# Patient Record
Sex: FEMALE | Race: ASIAN
[De-identification: names, ages, dates, MRNs, and addresses within clinical notes are randomized per-mention and may not be internally consistent; named-entity substitution may affect disease eponyms.]

---

## 2019-03-18 ENCOUNTER — HOSPITAL ENCOUNTER (INPATIENT)
Dept: HOSPITAL 80 - FED | Age: 84
LOS: 2 days | Discharge: HOME | DRG: 66 | End: 2019-03-20
Attending: HOSPITALIST | Admitting: HOSPITALIST
Payer: COMMERCIAL

## 2019-03-18 DIAGNOSIS — R29.705: ICD-10-CM

## 2019-03-18 DIAGNOSIS — I63.9: Primary | ICD-10-CM

## 2019-03-18 DIAGNOSIS — E11.9: ICD-10-CM

## 2019-03-18 DIAGNOSIS — K21.9: ICD-10-CM

## 2019-03-18 DIAGNOSIS — E86.9: ICD-10-CM

## 2019-03-18 DIAGNOSIS — R47.01: ICD-10-CM

## 2019-03-18 LAB
INR PPP: 0.89 (ref 0.83–1.16)
PLATELET # BLD: 296 10^3/UL (ref 150–400)
PROTHROMBIN TIME: 11.7 SEC (ref 12–15)

## 2019-03-18 PROCEDURE — C1764 EVENT RECORDER, CARDIAC: HCPCS

## 2019-03-18 RX ADMIN — SODIUM CHLORIDE AND POTASSIUM CHLORIDE SCH MLS: 9; 1.49 INJECTION, SOLUTION INTRAVENOUS at 12:48

## 2019-03-18 RX ADMIN — INSULIN LISPRO SCH: 100 INJECTION, SOLUTION INTRAVENOUS; SUBCUTANEOUS at 19:18

## 2019-03-18 NOTE — NEUROPROG
Assessment: 


[id number]_[id number]


  -


Neurology Consult:


-


CC: 


-


HPI: 


3/18/19: Pt last seen normal at 10 pm on 3/17/19.  Awoke at 6:30 am on 3/18/19 

with expressive aphasia so brought to [facility] ER.  Head CT showed no bleed and CTA 

head/neck showed no vessel occlusion or stenosis.  Pt outside window for TPA.  

Pt started on aspirin (not on prior to event).  Brain MRI showed left frontal 

cortical stroke.  Neurologic exam showed expressive aphasia and right tongue 

deviation.  Concern for embolic cause of stroke so will get cardiac eval.


-


PMHx: pt cannot answer due to aphasia


-


SHx: lives in [place]


FHx: pt cannot answer due to aphasia


-


ROS: Pt denied acute fever, total vision loss, active severe chest pain, 

respiratory failure, total body severe rash, total bowel/bladder incontinence, 

psychosis, active seizures, or active bleeding


-


O:


VS reviewed


General: Alert


Eyes: Fundoscopic exam not able to visualize optic disks


CV: Heart RRR, no murmur, no carotid bruit


Lungs: Clear to auscultation bilaterally, no rhonchi or rales


Neuro:


- Mental: pt has expressive aphasia and could not speak but was able to follow 1

-2 step commands, no further cognitive testing could be performed due to aphasia


- Cranial Nerves:


.     II: PERRL, VFFTC


.     III/IV/VI: EOMI, no nystagmus, normal smooth pursuits, no Ptosis


.     V: facial sensation intact to LT


.     VII: face symmetric to eye closure and smile


.     VIII: hearing intact to conversation


.     IX/X: uvula raises symmetrically


.     XI: SCM 5/5 B/L strength


.     XII: tongue protrudes to right with testing


- Motor: 


.     Tone: normal tone in all 4 extremity


.     Strength: no pronator drift, strength 5/5 throughout (B/L delt, bic, tri, 

hand , hf/he, df/pf)


- Reflexes: B/L bic/BR/patella 2/4


- Sensory: all 4 extremity intact to light touch


- Coord: CHERYL wnl


- Gait: deferred


-


Labs:


3/18/19- Na 137


-


Rads:


3/18/19- Head CT wo: no acute bleed


3/18/19- Head/neck CTA: no significant stenosis or vessel abnl


3/18/19- Brain MRI wo: left frontal cortical stroke (I personally visualized 

the images on 3/18/19)


-


Assessment:


1. Left frontal cortical stroke causing expressive aphasia on 3/18/19: CTA head/

neck, telemetry unremarkable for cause.  Given cortical location concern for 

emboli.  Pt not on aspirin prior to stroke.


-


Plan:


- TTE


- 24 hour telemetry


- If workup is negative then recommend 3 month LINQ monitor for any paroxysmal 

afib


- Begin aspirin 325 mg qd for stroke prevention


- Blood pressure goal < 220/120 x 48 hours then < 140/90


- LDL < 70 (lipid panel pending)


- H1AC < 7.0 (H1AC pending)


- PT/OT/Speech to determine rehab needs


- F/U in neurology clinic 1-4 weeks after hospital discharge





Objective: 





 Vital Signs











Temp Pulse Resp BP Pulse Ox


 


 36.7 C   80   16   136/97 H  94 


 


 03/18/19 10:43  03/18/19 12:00  03/18/19 12:00  03/18/19 12:00  03/18/19 12:00








 











PT  11.7 SEC (12.0-15.0)  L  03/18/19  07:06    


 


INR  0.89  (0.83-1.16)   03/18/19  07:06    











Allergies/Adverse Reactions: 


 





No Known Allergies Allergy (Unverified 03/18/19 07:39)

## 2019-03-18 NOTE — CPEKG
Test Reason : OPEN

Blood Pressure : ***/*** mmHG

Vent. Rate : 083 BPM     Atrial Rate : 083 BPM

   P-R Int : 166 ms          QRS Dur : 078 ms

    QT Int : 441 ms       P-R-T Axes : 063 006 034 degrees

   QTc Int : 519 ms

 

Sinus rhythm

Minimal ST depression, anterolateral leads

Prolonged QT interval

 

Confirmed by Ernesto Rodriguez ([id number]) on 3/18/2019 2:46:27 PM

 

Referred By: Ernesto Rodriguez           Confirmed By:Ernesto Rodriguez

## 2019-03-18 NOTE — ECHO
[email or web address]





[address] [address] 

Main: [phone] 



Echocardiography Examination 

Transthoracic 



Name:            JOSÉ ANTONIO SÁNCHEZ                         MR#:

[id number]

Study Date:      03/18/2019                             Study Time:

01:54 PM

YOB: 1935                             Age:

83 year(s)

Height:          147.3 cm (58 in.)                      Weight:

35.38 kg (78 lb.)

BSA:             1.22 m2                                Gender:

Female

Examination:     Echo with Agitated Saline              Contrast: 

Image Quality:   Good                                   Rhythm:

Normal sinus rhythm

Heart Rate:      77 bpm                                 BP:

133 mmHg/76 mmHg

Indication:      Ischemic Stroke 



Procedure Staff 

Referring Physician: 

Echocardiographer:         Tyler Balbuena RDCS 

Reading Physician:         Bassem Kelly MD 

Requesting Provider: 

Indication:                Ischemic Stroke 



Measurements 

Chambers                                           AV/MV 

Label                 Value       Normal Value          Label

Value       Normal Value

EF lower range (%)      75 %                            AV PGmax

8 mmHg

EF upper range (%)      80 %                            AV PGmean

4 mmHg

IVSd, 2D                0.8 cm    (0.6cm - 1.1cm)       AV Vmax

1.43 m/s

LVDd, 2D                4.1 cm    (3.9cm - 5.3cm)       DICKSON

(continuity eq.  1.7 cm2

LVDs, 2D                2.3 cm    (2.1cm - 4cm)         Vmax) 

LVEF, 2D                75 %      (54% - 74%)           DICKSON D

(continuity eq. 1.6 cm2

LVOT PGmax              3 mmHg                          VTI) 

LVOT PGmean             2 mmHg                          MV A Vmax

1.03 m/s

LVOT Vmax               0.93 m/s  (0.7m/s - 1.1m/s)     MV E' lateral

0.05 m/s

LVOT Vmean              0.58 m/s                        MV E' mean

0.04 m/s

LVOTd                   1.8 cm    (1.8cm - 2cm)         MV E' septal

0.03 m/s

LVPWd, 2D               0.9 cm                          MV E Vmax

0.52 m/s

TAPSE                   2 cm                            MV E/A

0.5

LA Area, A2C            15.8 cm2  (0cm2 - 20cm2)        MV E/E'

lateral      10.2

LA Volume, A2C          44 ml     (22ml - 52ml)         MV E/E' mean

13

LA Volume, A4C          31 ml     (22ml - 52ml)         MV E/E' septal

15.6 (0.45 - 1.25)

LA Volume, BP           38 ml     (22ml - 52ml)    TV/PV 

LAESV index, MOD4       25.4 ml/m2                      Label

Value       Normal Value

RA Area                 10.4 cm2                        RA Pressure

5 mmHg

Additional Vessels                                      RVSP

49 mmHg



Patient: JOSÉ ANTONIO SÁNCHEZ                      MRN: [id number]

Study Date: 03/18/2019   Page 1 of 3

01:54 PM 









Label Value Normal Value TR Pmax 44 mmHg 

AoRoot, MM              2.8 cm    (2.2cm - 3.7cm)       TR Vmax

3.3 m/s



PV PGmax             4 mmHg 

PV Vmax, Caliper     0.96 m/s     (0.6m/s - 0.9m/s) 



Conclusions 



Left Ventricle: 

 EF range is estimated at 75 % - 80 %.  



IAS: 

 An agitated saline study was performed and was positive for

intracardiac shunting.



Mitral Valve: 

 Trivial mitral regurgitation.  



Tricuspid Valve: 

 Mild to moderate tricuspid regurgitation.  



Findings 



Left Ventricle: 

Left ventricle is normal in size. The ejection fraction, measured by

2D, is 75 %. EF range is estimated at 75 % - 80 %.

Left ventricle wall thickness is normal. There are no regional wall

motion abnormalities. Cannot determine LAP and

Diastolic Dysfunction Grade.  

Right Ventricle: 

Normal size right ventricle. The RV function appears grossly normal.  

Left Atrium: 

The left atrium is normal in size.  

IAS: 

An agitated saline study was performed and was positive for

intracardiac shunting.

Right Atrium Measurements 

RA Area is 10.4 cm2.  

Mitral Valve: 

Mitral valve appears structurally normal. Trivial mitral

regurgitation. No mitral valve stenosis.

Aortic Valve: 

No aortic valve regurgitation. There is no aortic stenosis. The aortic

valve is trileaflet.

Tricuspid Valve: 

Mild to moderate tricuspid regurgitation. Right Ventricular systolic

pressure is measured at 49 mmHg. Pulmonary artery

pressure is mildly increased.  

Pulmonic Valve: 

Pulmonic leaflets are structurally normal. No pulmonic valve

regurgitation is evident.

Aorta: 

The aorta is normal. The aortic root size in M-mode measures 2.8 cm.  

Aorta Measurements 

AoRoot, MM is 2.8 cm.  

Pericardium: 

No pericardial effusion.  



Exam Details 

Procedure Ordered:         Echo with Agitated Saline 

Procedure Status:          Routine study 

Image Quality:             Good 



Patient: JOSÉ ANTONIO SÁNCHEZ                      MRN: [id number]

Study Date: 03/18/2019   Page 2 of 3

01:54 PM 









Facility Location: Cardiac Echo 1 



Electronically signed by Bassem Kelly MD on 03/18/2019 at 03:04

PM

(No Signature Object) 



Patient: JOSÉ ANTONIO SÁNCHEZ                      MRN: [id number]

Study Date: 03/18/2019   Page 3 of 3

01:54 PM 







[id number]

## 2019-03-18 NOTE — PDGENHP
History and Physical





- Chief Complaint


aphasia





- History of Present Illness


84 yo female with h/o DM and htn presented to ED after awakening this am unable 

to speak.  She was last seen normal last night around 10 pm.  This morning, she 

could not speak and there was a right sided facial droop noted.  EMS was called 

and she was brought to the ED where her symptoms persisted.  CTA was negative 

for large vessel occlusion.  MRI showed broca's area infarct.  She has no h/o 

prior CVA and no h/o A fib.  She is treated for hypertension and has well 

controlled diabetes.  She denies CP, SOB, headache, vision changes or focal 

limb weakness.  She has previously taken Aspirin 81 mg daily.


She is admitted for further management of acute CVA.





History Information





- Allergies/Home Medication List


Allergies/Adverse Reactions: 








No Known Allergies Allergy (Unverified 03/18/19 07:39)


 





Home Medications: 








Pantoprazole Sodium [Protonix 40mg (*)] 40 mg PO DAILY 03/18/19 [Last Taken 

Unknown]


glipiZIDE [Glucotrol] 10 mg PO BID 03/18/19 [Last Taken Unknown]





I have personally reviewed and updated: family history, medical history, social 

history, surgical history





- Past Medical History


diabetes type 2, hypertension





- Surgical History


Reports: no pertinent surgical hx





- Family History


Positive for: non-pertinent





- Social History


Smoking Status: Never smoked


Alcohol Use: None


Drug Use: None


Additional social history: Lives independently





Review of Systems


Review of Systems: 





ROS: 10pt was reviewed & negative except for what was stated in HPI & below





Physical Exam


Physical Exam: 

















Temp Pulse Resp BP Pulse Ox


 


 36.7 C   85   25 H  141/78 H  96 


 


 03/18/19 10:43  03/18/19 10:43  03/18/19 10:43  03/18/19 10:43  03/18/19 10:43











Constitutional: no apparent distress


Eyes: PERRL


Ears, Nose, Mouth, Throat: moist mucous membranes


Cardiovascular: regular rate and rhythym


Respiratory: no respiratory distress, clear to auscultation


Gastrointestinal: normoactive bowel sounds, soft, non-tender abdomen


Skin: warm


Musculoskeletal: full muscle strength


Neurologic: AAOx3, facial droop, other (aphasia)


Psychiatric: interacting appropriately





Lab Data & Imaging Review





 03/18/19 07:06





 03/18/19 07:06














WBC  6.50 10^3/uL (3.80-9.50)   03/18/19  07:06    


 


RBC  4.81 10^6/uL (4.18-5.33)   03/18/19  07:06    


 


Hgb  11.8 g/dL (12.6-16.3)  L  03/18/19  07:06    


 


POC Hgb  13.6 gm/dL (12.6-16.3)   03/18/19  07:14    


 


Hct  38.5 % (38.0-47.0)   03/18/19  07:06    


 


POC Hct  40 % (38-47)   03/18/19  07:14    


 


MCV  80.0 fL (81.5-99.8)  L  03/18/19  07:06    


 


MCH  24.5 pg (27.9-34.1)  L  03/18/19  07:06    


 


MCHC  30.6 g/dL (32.4-36.7)  L  03/18/19  07:06    


 


RDW  17.5 % (11.5-15.2)  H  03/18/19  07:06    


 


Plt Count  296 10^3/uL (150-400)   03/18/19  07:06    


 


MPV  8.8 fL (8.7-11.7)   03/18/19  07:06    


 


Neut % (Auto)  36.4 % (39.3-74.2)  L  03/18/19  07:06    


 


Lymph % (Auto)  50.9 % (15.0-45.0)  H  03/18/19  07:06    


 


Mono % (Auto)  10.6 % (4.5-13.0)   03/18/19  07:06    


 


Eos % (Auto)  1.4 % (0.6-7.6)   03/18/19  07:06    


 


Baso % (Auto)  0.5 % (0.3-1.7)   03/18/19  07:06    


 


Nucleat RBC Rel Count  0.0 % (0.0-0.2)   03/18/19  07:06    


 


Absolute Neuts (auto)  2.37 10^3/uL (1.70-6.50)   03/18/19  07:06    


 


Absolute Lymphs (auto)  3.31 10^3/uL (1.00-3.00)  H  03/18/19  07:06    


 


Absolute Monos (auto)  0.69 10^3/uL (0.30-0.80)   03/18/19  07:06    


 


Absolute Eos (auto)  0.09 10^3/uL (0.03-0.40)   03/18/19  07:06    


 


Absolute Basos (auto)  0.03 10^3/uL (0.02-0.10)   03/18/19  07:06    


 


Absolute Nucleated RBC  0.00 10^3/uL (0-0.01)   03/18/19  07:06    


 


Immature Gran %  0.2 % (0.0-1.1)   03/18/19  07:06    


 


Immature Gran #  0.01 10^3/uL (0.00-0.10)   03/18/19  07:06    


 


PT  11.7 SEC (12.0-15.0)  L  03/18/19  07:06    


 


INR  0.89  (0.83-1.16)   03/18/19  07:06    


 


APTT  27.1 SEC (23.0-38.0)   03/18/19  07:06    


 


POC Sodium  137 mEq/L (135-145)   03/18/19  07:14    


 


Sodium  135 mEq/L (135-145)   03/18/19  07:06    


 


POC Potassium  3.0 mEq/L (3.3-5.0)  L  03/18/19  07:14    


 


Potassium  3.1 mEq/L (3.5-5.2)  L  03/18/19  07:06    


 


POC Chloride  99 mEq/L ()   03/18/19  07:14    


 


Chloride  99 mEq/L ()   03/18/19  07:06    


 


Carbon Dioxide  23 mEq/l (22-31)   03/18/19  07:06    


 


POC Total CO2  23 mEq/L (22-31)   03/18/19  07:14    


 


Anion Gap  13 mEq/L (6-14)   03/18/19  07:06    


 


POC BUN  12 mg/dL (7-23)   03/18/19  07:14    


 


BUN  14 mg/dL (7-23)   03/18/19  07:06    


 


Creatinine  0.6 mg/dL (0.6-1.0)   03/18/19  07:06    


 


POC Creatinine  0.5 mg/dL (0.6-1.0)  L  03/18/19  07:14    


 


Estimated GFR  > 60   03/18/19  07:06    


 


Glucose  204 mg/dL ()  H  03/18/19  07:06    


 


POC Glucose  214 mg/dL ()  H  03/18/19  07:14    


 


Calcium  8.8 mg/dL (8.5-10.4)   03/18/19  07:06    


 


POC Troponin I  0.00 ng/mL (0.00-0.08)   03/18/19  07:17    


 


TSH  1.370 uIU/mL (0.465-4.680)   03/18/19  07:06    








Visualized and Interpreted EKG results: Yes


EKG Interpretation: Positive for: normal sinsus rhythm, ST depression





Assessment & Plan


Assessment: 








Acute ischemic stroke with Broca's aphasia - and right sided facial droop.  MRI 

confirms Broca's area infarct.  CTA head/neck neg for lg vessel occlusion.  

Currently normotensive.  No h/o a fib.


   -admit for telemetry monitoring, neurochecks


   -increase ASA to 325 daily, add statin, check lipid panel in am


   -check echo


   -TSH, a1c


   -glycemic control


   -permissive htn, prn labetalol for SBP >220, DBP >120


   -NPO for now pending speech eval, along with PT/OT


   -neurology to consult


   -if no a fib detected here, will discuss linq placement with cards prior to 

dc





DM type 2 - hold glipizide, SSI for now





Full code





Dispo - admit to inpt, anticipate >48 hrs hospitalization for ongoing 

management of acute stroke and aphasia

## 2019-03-18 NOTE — EDPHY
H & P


Time Seen by Provider: 03/18/19 07:05


Constitutional: 


 Initial Vital Signs











Temperature (C)  36.6 C   03/18/19 07:30


 


Heart Rate  84   03/18/19 07:30


 


Respiratory Rate  18   03/18/19 07:30


 


Blood Pressure  141/58 H  03/18/19 07:30


 


O2 Sat (%)  99   03/18/19 07:30








 











O2 Delivery Mode               Room Air














Allergies/Adverse Reactions: 


 





No Known Allergies Allergy (Unverified 03/18/19 07:39)


 








Home Medications: 














 Medication  Instructions  Recorded


 


Glipizide  03/18/19














Medical Decision Making





- Diagnostics


Imaging Results: 


 Imaging Impressions





Head CT  03/18/19 07:12


Impression: 


1. Possible acute/subacute ischemia left temporal lobe as described, MRI is 

recommended for further evaluation.


 


Final results are concordant with initial interpretation.  Preliminary report 

was communicated to the referring provider at 7:43 AM.  ALYSSA.


 


 








Head CTA  03/18/19 07:12


Impression:


 


1. There is no hemodynamically significant ICA stenosis.


2. Patent vertebral arteries.


 


 


CT ANGIOGRAPHY OF THE BRAIN: Study is limited by venous contamination. The 

major vessels of the [place] are well visualized, and there is no 

aneurysm, vascular malformation, flow-limiting stenosis, or acute occlusion 

identified. Calcified atherosclerotic plaque is seen in the internal carotid 

arteries without high-grade stenosis or occlusion. The distal cervical, petrous

, cavernous, and supraclinoid portions of the internal carotid arteries are 

patent. Anterior and middle cerebral arteries are widely patent. Hypoplastic 

right A1 segment. With regards to the posterior circulation, the distal 

vertebral arteries are patent. The there is fetal origin of the right PCA. The 

vertebrobasilar confluence and Posterior cerebral arteries are patent. 


 


Impression:  Negative CT angiogram of the brain. With high clinical suspicion 

for acute ischemia, MRI is recommended for further evaluation. Please see 

separately dictated CT head for further findings.


 


Final results are concordant with initial interpretation.  Preliminary report 

was communicated to the referring provider at 7:51 AM.  1.


  


 


 








Neck CTA  03/18/19 07:12


Impression:


 


1. There is no hemodynamically significant ICA stenosis.


2. Patent vertebral arteries.


 


 


CT ANGIOGRAPHY OF THE BRAIN: Study is limited by venous contamination. The 

major vessels of the [place] are well visualized, and there is no 

aneurysm, vascular malformation, flow-limiting stenosis, or acute occlusion 

identified. Calcified atherosclerotic plaque is seen in the internal carotid 

arteries without high-grade stenosis or occlusion. The distal cervical, petrous

, cavernous, and supraclinoid portions of the internal carotid arteries are 

patent. Anterior and middle cerebral arteries are widely patent. Hypoplastic 

right A1 segment. With regards to the posterior circulation, the distal 

vertebral arteries are patent. The there is fetal origin of the right PCA. The 

vertebrobasilar confluence and Posterior cerebral arteries are patent. 


 


Impression:  Negative CT angiogram of the brain. With high clinical suspicion 

for acute ischemia, MRI is recommended for further evaluation. Please see 

separately dictated CT head for further findings.


 


Final results are concordant with initial interpretation.  Preliminary report 

was communicated to the referring provider at 7:51 AM.  1.


  


 


 











Imaging: Discussed imaging studies w/ On call Radiologist, I viewed and 

interpreted images myself


ED Course/Re-evaluation: 





CHIEF COMPLAINT:  Stroke alert





HISTORY OF PRESENT ILLNESS:  


The patient is an 84 y/o female arriving via EMS as a stroke alert. Per EMS, 

the patient was last seen normal at 22:00, 9 hours ago. When the patient woke 

up this morning at 06:30, her  noted that the patient was unable to speak

, which is not normal. The patient was able to walk out to the ambulance. While 

en route to the emergency department she was hypertensive with a BP of 200/100 

as well as a HR of 100 and BGL of 233. 





REVIEW OF SYSTEMS:  





Unable to obtain secondary to patient's mental status





PHYSICAL EXAM:  





HR, BP, O2 Sat, RR.  Temp noted


General Appearance:  Alert, well hydrated, appropriate, and non-toxic appearing.


Head:  Atraumatic without scalp tenderness or obvious injury


Eyes:  Pupils equal, round, reactive to light and accommodation, EOMI, no trauma

, no injection.


Ears:  Clear bilaterally, no perforation, normal landmarks


Nose:  Atraumatic, no rhinorrhea, clear.


Throat:  There is no erythema or exudates, no lesions, normal tonsils, mucus 

membranes moist.


Neck:  Supple, 2+ carotid upstroke, nontender, no lymphadenopathy.


Respiratory:  No retractions, no distress, no wheezes, and no accessory muscle 

use.  Lungs are clear to auscultation bilaterally.


Cardiovascular:  Regular rate and rhythm, no murmurs, rubs, or gallops. 

Bilateral carotid, radial, dorsalis pedis, and posterior tibial pulses intact. 

Good capillary refill all extremities.


Gastrointestinal:  Abdomen is soft, nontender, non-distended, no masses, no 

rebound, no guarding, no peritoneal signs.


Musculoskeletal:  Normal active ROM of all extremities, atraumatic.


Neurological:  Patient has Broca's aphasia and is not talking. The patient has 

non-focal cranial nerves, motor, sensory, and cerebellar exam.


Skin:  No rashes, good turgor, no nodules on palpation.





Past medical history: Unknown


Past surgical history: Unknown


Family history: Unknown


Social history: , lives in [place], retired





DIAGNOSTICS/PROCEDURES/CRITICAL CARE TIME:  





EKG: The 12 lead EKG was interpreted by myself as sinus rhythm with a rate of 83

, minimal ST depression, prolonged QT interval. See hard copy and/or 

"tracemaster" electronic copy for interpretation.





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





Head CTA: No acute findings.





Neck CTA: No acute findings.





Brain MRI: Infarct in Broca's area.





DIFFERENTIAL DIAGNOSIS:   


The differential diagnosis for the patient's altered mental status included but 

was not limited to Broca's area infarct, Broca's aphasia, hypoglycemia, 

infectious process, electrolyte abnormality, head injury, neurologic process, 

anemia, cardiac process, and intoxicants.





MEDICAL DECISION MAKING:  


The patient is an 84 y/o female arriving via EMS as a stroke alert as she was 

unable to talk this morning. She was last seen normal at 22:00, 9 hours ago. 

She is able to walk without difficult. On exam the patient has no peripheral or 

cerebellar findings. She does have Broca's aphasia and is not talking at all. 

She is not a TPA candidate as she is out of the TPA time frame. I suspect she 

is having a large vessel occlusion. Labs, EKG, head CT, head and neck CTA 

ordered. Patient will be sent directly to CT.





0705: I met EMS upon arrival.





0730: I viewed patient's imaging findings, which have no acute findings. 

Radiologist reading still pending.





0731: Patient is back from CT; [facility] has been paged.





0732: I interpreted patient's EKG as sinus rhythm with a rate of 83, minimal ST 

depression, prolonged QT interval.





0734: I consulted with Dr. Bansal, neurologist, from [facility] 

regarding this patient. She agrees that unless there are findings on the CTA's 

there are no additional interventions to perform.





0745: I received the fax from the over-night radiology for the head CT, which 

reveals a subtle hyperdensity in left temporal lobe which is congenital.





0747: I consulted with Dr. Bansal regarding patient's exam and head CT findings. 

Upon re-examination she is making more noises than before and is able to write 

words. If the patient's vessels are occluded, we will send her to [facility]. If 

there is no occlusion, the patient will be admitted here. Her BP is 141/58; 

324mg PO Aspirin and 500cc Bolus of IV NS. Brain MRI ordered.





0743: I spoke with Dr. Dias, radiologist from [facility], regarding patient's 

CT and CTA findings.  There are no acute findings besides the congenital 

anomaly.





0815: Patient had difficulty taking the Aspirin; she will need a swallow study.





0816: I consulted with the hospitalist service, Dr. Cushing accepts admission 

of this patient. The swallow study will be performed upon admission.





0827: I consulted with Dr. Acosta, neurologist, regarding patient and plan for 

admission. He agrees to follow this patient during her admission.





0949: I spoke with Dr. Marie, radiologist, who reports that the patient has an 

infarct in Broca's area. Patient is safe to be transported to the floor.








- Data Points


Laboratory Results: 


 Laboratory Results





 03/18/19 07:06 





 03/18/19 07:06 





 











  03/18/19 03/18/19 03/18/19





  07:17 07:14 07:06


 


WBC      





    


 


RBC      





    


 


Hgb      





    


 


POC Hgb    13.6 gm/dL gm/dL  





    (12.6-16.3)  


 


Hct      





    


 


POC Hct    40 % %  





    (38-47)  


 


MCV      





    


 


MCH      





    


 


MCHC      





    


 


RDW      





    


 


Plt Count      





    


 


MPV      





    


 


Neut % (Auto)      





    


 


Lymph % (Auto)      





    


 


Mono % (Auto)      





    


 


Eos % (Auto)      





    


 


Baso % (Auto)      





    


 


Nucleat RBC Rel Count      





    


 


Absolute Neuts (auto)      





    


 


Absolute Lymphs (auto)      





    


 


Absolute Monos (auto)      





    


 


Absolute Eos (auto)      





    


 


Absolute Basos (auto)      





    


 


Absolute Nucleated RBC      





    


 


Immature Gran %      





    


 


Immature Gran #      





    


 


PT      





    


 


INR      





    


 


APTT      





    


 


POC Sodium    137 mEq/L mEq/L  





    (135-145)  


 


Sodium      135 mEq/L mEq/L





     (135-145) 


 


POC Potassium    3.0 mEq/L L mEq/L  





    (3.3-5.0)  


 


Potassium      3.1 mEq/L L mEq/L





     (3.5-5.2) 


 


POC Chloride    99 mEq/L mEq/L  





    ()  


 


Chloride      99 mEq/L mEq/L





     () 


 


Carbon Dioxide      23 mEq/l mEq/l





     (22-31) 


 


POC Total CO2    23 mEq/L mEq/L  





    (22-31)  


 


Anion Gap      13 mEq/L mEq/L





     (6-14) 


 


POC BUN    12 mg/dL mg/dL  





    (7-23)  


 


BUN      14 mg/dL mg/dL





     (7-23) 


 


Creatinine      0.6 mg/dL mg/dL





     (0.6-1.0) 


 


POC Creatinine    0.5 mg/dL L mg/dL  





    (0.6-1.0)  


 


Estimated GFR      > 60 





    


 


Glucose      204 mg/dL H mg/dL





     () 


 


POC Glucose    214 mg/dL H mg/dL  





    ()  


 


Calcium      8.8 mg/dL mg/dL





     (8.5-10.4) 


 


POC Troponin I  0.00 ng/mL ng/mL    





   (0.00-0.08)   














  03/18/19 03/18/19





  07:06 07:06


 


WBC    6.50 10^3/uL 10^3/uL





    (3.80-9.50) 


 


RBC    4.81 10^6/uL 10^6/uL





    (4.18-5.33) 


 


Hgb    11.8 g/dL L g/dL





    (12.6-16.3) 


 


POC Hgb    





   


 


Hct    38.5 % %





    (38.0-47.0) 


 


POC Hct    





   


 


MCV    80.0 fL L fL





    (81.5-99.8) 


 


MCH    24.5 pg L pg





    (27.9-34.1) 


 


MCHC    30.6 g/dL L g/dL





    (32.4-36.7) 


 


RDW    17.5 % H %





    (11.5-15.2) 


 


Plt Count    296 10^3/uL 10^3/uL





    (150-400) 


 


MPV    8.8 fL fL





    (8.7-11.7) 


 


Neut % (Auto)    36.4 % L %





    (39.3-74.2) 


 


Lymph % (Auto)    50.9 % H %





    (15.0-45.0) 


 


Mono % (Auto)    10.6 % %





    (4.5-13.0) 


 


Eos % (Auto)    1.4 % %





    (0.6-7.6) 


 


Baso % (Auto)    0.5 % %





    (0.3-1.7) 


 


Nucleat RBC Rel Count    0.0 % %





    (0.0-0.2) 


 


Absolute Neuts (auto)    2.37 10^3/uL 10^3/uL





    (1.70-6.50) 


 


Absolute Lymphs (auto)    3.31 10^3/uL H 10^3/uL





    (1.00-3.00) 


 


Absolute Monos (auto)    0.69 10^3/uL 10^3/uL





    (0.30-0.80) 


 


Absolute Eos (auto)    0.09 10^3/uL 10^3/uL





    (0.03-0.40) 


 


Absolute Basos (auto)    0.03 10^3/uL 10^3/uL





    (0.02-0.10) 


 


Absolute Nucleated RBC    0.00 10^3/uL 10^3/uL





    (0-0.01) 


 


Immature Gran %    0.2 % %





    (0.0-1.1) 


 


Immature Gran #    0.01 10^3/uL 10^3/uL





    (0.00-0.10) 


 


PT  11.7 SEC L SEC  





   (12.0-15.0)  


 


INR  0.89   





   (0.83-1.16)  


 


APTT  27.1 SEC SEC  





   (23.0-38.0)  


 


POC Sodium    





   


 


Sodium    





   


 


POC Potassium    





   


 


Potassium    





   


 


POC Chloride    





   


 


Chloride    





   


 


Carbon Dioxide    





   


 


POC Total CO2    





   


 


Anion Gap    





   


 


POC BUN    





   


 


BUN    





   


 


Creatinine    





   


 


POC Creatinine    





   


 


Estimated GFR    





   


 


Glucose    





   


 


POC Glucose    





   


 


Calcium    





   


 


POC Troponin I    





   











Medications Given: 


 








Discontinued Medications





Aspirin (Aspirin)  324 mg PO EDNOW ONE


   Stop: 03/18/19 07:51


   Last Admin: 03/18/19 07:53 Dose:  324 mg


Sodium Chloride (Ns)  1,000 mls @ 0 mls/hr IV EDNOW ONE; [place]


   PRN Reason: Protocol


   Stop: 03/18/19 07:50


   Last Admin: 03/18/19 07:57 Dose:  1,000 mls





Point of Care Test Results: 


 Chemistry











  03/18/19 03/18/19





  07:17 07:14


 


POC Sodium    137 mEq/L mEq/L





    (135-145) 


 


POC Potassium    3.0 mEq/L L mEq/L





    (3.3-5.0) 


 


POC Chloride    99 mEq/L mEq/L





    () 


 


POC Total CO2    23 mEq/L mEq/L





    (22-31) 


 


POC BUN    12 mg/dL mg/dL





    (7-23) 


 


POC Creatinine    0.5 mg/dL L mg/dL





    (0.6-1.0) 


 


POC Glucose    214 mg/dL H mg/dL





    () 


 


POC Troponin I  0.00 ng/mL ng/mL  





   (0.00-0.08)  








 ISTAT H&H











  03/18/19





  07:14


 


POC Hgb  13.6 gm/dL gm/dL





   (12.6-16.3) 


 


POC Hct  40 % %





   (38-47) 














Departure





- Departure


Disposition: [facility] Inpatient Acute


Clinical Impression: 


 Acute ischemic stroke, Broca's aphasia, Broca's area infarct





Condition: Fair


Referrals: 


Patient,NotPresent [Unknown] - As per Instructions


Report Scribed for: Ernesto Rodriguez


Report Scribed by: Indu Kelly


Date of Report: 03/18/19


Time of Report: 06:58

## 2019-03-18 NOTE — PDMN
Medical Necessity


Medical necessity: Pt meets inpt criteria per MD order and MCG M-83, Stroke: 

Ischemic, 2 days. 84 y/o w/acute ischemic stroke w/Broca's aphasia and R sided 

facial droop. Broca's area infarct confirmed w/MRI. Neuro consult, PT/OT/SP 

evals pending, tele, ECHO pending, anticipate>2MN for ongoing management of 

above.

## 2019-03-19 PROCEDURE — 0JH60PZ INSERTION OF CARDIAC RHYTHM RELATED DEVICE INTO CHEST SUBCUTANEOUS TISSUE AND FASCIA, OPEN APPROACH: ICD-10-PCS | Performed by: INTERNAL MEDICINE

## 2019-03-19 RX ADMIN — ATORVASTATIN CALCIUM SCH MG: 20 TABLET, FILM COATED ORAL at 08:15

## 2019-03-19 RX ADMIN — INSULIN LISPRO SCH UNITS: 100 INJECTION, SOLUTION INTRAVENOUS; SUBCUTANEOUS at 17:39

## 2019-03-19 RX ADMIN — ENOXAPARIN SODIUM SCH MG: 100 INJECTION SUBCUTANEOUS at 08:15

## 2019-03-19 RX ADMIN — PANTOPRAZOLE SODIUM SCH: 40 TABLET, DELAYED RELEASE ORAL at 10:34

## 2019-03-19 RX ADMIN — INSULIN LISPRO SCH: 100 INJECTION, SOLUTION INTRAVENOUS; SUBCUTANEOUS at 07:34

## 2019-03-19 RX ADMIN — INSULIN LISPRO SCH UNITS: 100 INJECTION, SOLUTION INTRAVENOUS; SUBCUTANEOUS at 12:23

## 2019-03-19 RX ADMIN — ENOXAPARIN SODIUM SCH: 100 INJECTION SUBCUTANEOUS at 09:18

## 2019-03-19 RX ADMIN — SODIUM CHLORIDE AND POTASSIUM CHLORIDE SCH MLS: 9; 1.49 INJECTION, SOLUTION INTRAVENOUS at 02:17

## 2019-03-19 NOTE — NEUROPROG
Assessment: 


[id number]


  -


Neurology Consult:


-


CC: F/U for stroke


-


Narrative Summary:


3/18/19: Pt last seen normal at 10 pm on 3/17/19.  Awoke at 6:30 am on 3/18/19 

with expressive aphasia so brought to [facility] ER.  Head CT showed no bleed and CTA 

head/neck showed no vessel occlusion or stenosis.  Pt outside window for TPA.  

Pt on aspirin prior to stroke.  Brain MRI showed left frontal cortical stroke.  

Neurologic exam showed expressive aphasia and right tongue deviation.


-


HPI:


F/U 3/19/19.  LDL 45L but H1AC elevated at 9.1.  TTE and telemetry show no 

clear cause of stroke.  Given elevated H1AC I suspect this is the likely cause 

of stroke.  Pt was on aspirin prior to stroke so I would recommend changing 

aspirin to plavix (aspirin failure).  Recommend pt get LINQ monitor by 

cardiology to assess for paroxysmal afib.


-


PMHx: pt cannot answer due to aphasia


-


SHx: lives in [place]


FHx: pt cannot answer due to aphasia


-


ROS: Pt denied acute fever, total vision loss, active severe chest pain, 

respiratory failure, total body severe rash, total bowel/bladder incontinence, 

psychosis, active seizures, or active bleeding


-


Labs:


3/18/19- LDL 45L, H1AC 9.1H


-


Rads:


3/18/19- Head CT wo: no acute bleed


3/18/19- Head/neck CTA: no significant stenosis or vessel abnl


3/18/19- Brain MRI wo: left frontal cortical stroke


3/18/19- TTE: no cardiac thrombus noted, PFO noted but unlikely cause of stroke


3/18/19- Telemetry: no afib noted


-


Assessment:


1. Left frontal cortical stroke causing expressive aphasia on 3/18/19: LDL 45L 

but H1AC 9.1 so likely cause of stroke.  CTA head/neck, telemetry, TTE 

unremarkable for cause.  Given cortical location concern for emboli.  Pt on 

aspirin prior to stroke so will change to plavix.  I will also recommend LINQ 

monitor.


-


Plan:


- recommend LINQ monitor for any paroxysmal afib


- Change aspirin to plavix 75 mg qd (stroke on 3/18/19 occurred on aspirin) for 

stroke prevention


- Blood pressure goal < 220/120 x 24 hours then < 140/90


- LDL < 70 (45)


- H1AC < 7.0 (9.1)


- PT/OT/Speech to determine rehab needs


- F/U in neurology clinic 1-4 weeks after hospital discharge


- No further inpt w/u needed, neurology will sign off





Objective: 





 Vital Signs











Temp Pulse Resp BP Pulse Ox


 


 36.4 C   74   20   127/73 H  93 


 


 03/19/19 07:19  03/19/19 07:19  03/19/19 07:19  03/19/19 07:19  03/19/19 07:19








 Laboratory Results





 03/19/19 05:15 





 











 03/18/19 03/19/19 03/20/19





 05:59 05:59 05:59


 


Intake Total  2309 


 


Balance  2309 








 











PT  11.7 SEC (12.0-15.0)  L  03/18/19  07:06    


 


INR  0.89  (0.83-1.16)   03/18/19  07:06    











Allergies/Adverse Reactions: 


 





No Known Allergies Allergy (Unverified 03/18/19 07:39)

## 2019-03-19 NOTE — ASMTCMCOM
CM Note

 

CM Note                       

Notes:

CM met with pt, her  and daughter. Family is advocating for pt to go to inpt rehab. CM 

initiated referral to [place] for approval for rehab. CM provided education on SNF if [place] does 

not approve inpt rehab. CM to follow. 



Plan: Inpatient rehab pending [place] approval. 

 

Date Signed:  03/19/2019 11:49 AM

Electronically Signed By:NYDIA Blackwood

## 2019-03-19 NOTE — PDCTREPORT
Cardiothoracic Procedure Rpt


Cardiothoracic Procedure Report: 





Loop recorder insertion


Indications cryptogenic stroke


Patient brought to the CV in the fasting state.  Left gustavo pectoral region was 

sterilely prepped and draped.  Small site was locally anesthetized with 2% 

xylocaine.  Stab wound was made.  Using the insertion tool a [employer] loop 

recorder was inserted under the skin.  2 staples were used to close the 

incision.  Conclusions successful implantation of a LINQ loop recorder.





Patient Problems: 


 Problems











Problem Status Onset


 


Acute ischemic stroke Acute  


 


Broca's aphasia Acute

## 2019-03-19 NOTE — HOSPPROG
Hospitalist Progress Note


Assessment/Plan: 





Acute ischemic stroke with Broca's aphasia - right sided facial droop and 

aphasia persist.  MRI confirms Broca's area infarct.  CTA head/neck neg for lg 

vessel occlusion.  Currently normotensive.  No e/o a fib on telemetry (pers 

reviewed/interp).  Echo with +intracardiac shunt.  


   -cont telemetry monitoring


   -change ASA to Plavix (had event on ASA 81 mg)


   -cont statin (LDL 45)


   -glycemic control


   -permissive htn, prn labetalol for SBP >220, DBP >120 though pt remains 

normotensive


   -cont speech, PT/OT


   -neurology consult appreciated, discussed with Dr. Acosta


   -no indication for MADELYN to further evaluate for PFO given her age, not 

candidate for closure


   -if no a fib detected here, will plan for linq placement with cards prior to 

dc, possibly tomorrow





DM type 2 - a1c 9 on max dose glipizide, unclear why she is not on MTF


   -hold glipizide, cont SSI for now


   -will discuss addition of MTF at discharge (defer for now given recent 

conrast) +/- addition of insulin





GERD - PPI





Full code





Dispo - cont inpt, will likely dc to inpt rehab, consult placed





Subjective: PT feels well.  Ambulated in halls without assistance.  Still some 

aphasia, but was able to say water this am.  Still some facial droop on the 

right.  Eating/drinking ok.


Objective: 


 Vital Signs











Temp Pulse Resp BP Pulse Ox


 


 36.4 C   74   20   127/73 H  93 


 


 03/19/19 07:19  03/19/19 07:19  03/19/19 07:19  03/19/19 07:19  03/19/19 07:19








 Laboratory Results





 03/19/19 05:15 





 











 03/18/19 03/19/19 03/20/19





 05:59 05:59 05:59


 


Intake Total  2309 


 


Balance  2309 








 











PT  11.7 SEC (12.0-15.0)  L  03/18/19  07:06    


 


INR  0.89  (0.83-1.16)   03/18/19  07:06    














- Physical Exam


Constitutional: no apparent distress


Eyes: PERRL


Ears, Nose, Mouth, Throat: moist mucous membranes


Cardiovascular: regular rate and rhythym


Respiratory: no respiratory distress, clear to auscultation


Gastrointestinal: normoactive bowel sounds, soft, non-tender abdomen


Skin: warm


Musculoskeletal: full muscle strength


Neurologic: AAOx3, facial droop, other (aphasia)


Psychiatric: interacting appropriately





ICD10 Worksheet


Patient Problems: 


 Problems











Problem Status Onset


 


Acute ischemic stroke Acute  


 


Broca's aphasia Acute

## 2019-03-20 VITALS — SYSTOLIC BLOOD PRESSURE: 111 MMHG | DIASTOLIC BLOOD PRESSURE: 66 MMHG

## 2019-03-20 RX ADMIN — INSULIN LISPRO SCH: 100 INJECTION, SOLUTION INTRAVENOUS; SUBCUTANEOUS at 08:29

## 2019-03-20 RX ADMIN — INSULIN LISPRO SCH UNITS: 100 INJECTION, SOLUTION INTRAVENOUS; SUBCUTANEOUS at 12:38

## 2019-03-20 RX ADMIN — ENOXAPARIN SODIUM SCH MG: 100 INJECTION SUBCUTANEOUS at 08:28

## 2019-03-20 RX ADMIN — ATORVASTATIN CALCIUM SCH MG: 20 TABLET, FILM COATED ORAL at 08:27

## 2019-03-20 RX ADMIN — PANTOPRAZOLE SODIUM SCH MG: 40 TABLET, DELAYED RELEASE ORAL at 08:28

## 2019-03-20 NOTE — GDS
[f rep st]



                                                             DISCHARGE SUMMARY





ALL DIAGNOSES:  

1.  Acute ischemic stroke.  

2.  Aphasia.  

3.  Diabetes mellitus type 2 with an A1c of 9.  

4.  Gastroesophageal reflux disease.



HOSPITAL COURSE:  This is an 83-year-old female, who woke up with aphasia.  MRI confirmed an acute is
chemic stroke in Broca area.  Workup for secondary causes of stroke was negative including CT angiogr
am of her head and neck, echocardiogram, and telemetry monitoring.  She did have a LINQ monitor place
d by Cardiology on the day prior to discharge.  Her LDL was checked 45.  Thus, she was not started on
 a statin.  She should follow up with her primary care physician for ongoing management.  Neurology r
ecommended changing her to Plavix from aspirin given that this occurred while she was on aspirin.  Sh
sergio has been given a prescription for this. 



She will need to have tight followup for her A1c of 9 by her primary care physician.  She is currentl
y on glipizide, unclear why she is not on additional medicines, but these would be warranted, especia
lly given her stroke.



FOLLOWUP:  

1.  Primary care physician for ongoing management of her diabetes.

2.  Outpatient Cardiology for followup of cardiac monitoring on her LINQ monitor.

3.  Neurology for ongoing management of her stroke.

4.  Primary care physician for consideration of addition of a statin, although LDL is very low.



DISPOSITION:  She is discharged home in stable condition.  She will have outpatient followup set up p
brenda Mejia.



BILLING:  I spent more than 30 minutes on the day of discharge coordinating care.





Job #:  [id number]

## 2019-03-20 NOTE — ASMTCMCOM
CM Note

 

CM Note                       

Notes:

Pt medically stable for d/c home with husb/dghtr supervision and intensive outpatient at [facility]. 

 

SLP rec home care, OT rec SNF and PT rec home. [facility] declined pt for SNF and inpatient 

rehab, approving pt for intensive outpatient at [facility]. Pt dghtr Anastacia talked to Jennifer Mejia prior to d/c and feels comfortable with the [facility] recommendation. Pt will get three hours 

of therapy a day, three times a week at [facility]. Anastacia and pt spouse feel comfortable getting pt to 


[facility] three times a week and will follow up with [facility] if they change their mind. Anastacia will stay 

with pt and  at d/c for supervision. 

 

Date Signed:  03/20/2019 03:41 PM

Electronically Signed By:MANJIT Engel

## 2019-03-20 NOTE — ASDISCHSUM
----------------------------------------------

Discharge Information

----------------------------------------------

Plan Status:Home with No Needs                       Medically Cleared to Leave:

Discharge Date:03/20/2019 03:25 PM                   CM D/C Disposition:

ADT D/C Disposition:Home, Routine, Self-Care         Projected Discharge Date:03/20/2019 11:00 AM

Transportation at D/C:                               Discharge Delay Reason:

Follow-Up Date:03/20/2019 11:00 AM                   Discharge Slot:

Final Diagnosis:

----------------------------------------------

Placement Information

----------------------------------------------

Referral Type:[facility] Term [facility]          Referral ID:[id number]

Provider Name:

Address 1:                                           Phone Number:

Address 2:                                           Fax Number:

City:                                                Selection Factors:

State:

 

Referral Type:*Home Health Care Services             Referral ID:[id number]

Provider Name:

Address 1:                                           Phone Number:

Address 2:                                           Fax Number:

City:                                                Selection Factors:

State:

 

----------------------------------------------

Patient Contact Information

----------------------------------------------

Contact Name:CLYDE                             Relationship:

Address:[address]                           Home [phone]

                                                     Work Phone:

City:[facility] Phone:

[facility]/Zip Code:[place] [address]                              Email:

----------------------------------------------

Financial Information

----------------------------------------------

Financial Class:Medicare Advantage Plans

Primary Plan Desc:[employer] ADV IP             Primary Plan Number:[id number]

Secondary Plan Desc:                                 Secondary Plan Number:

 

 

----------------------------------------------

Assessment Information

----------------------------------------------

----------------------------------------------

LACE

----------------------------------------------

LACE

 

Length of stay for            Answers:  2 days                                

current admission                                                             

Acuity / Level of             Answers:  Yes                                   

Care: Did the patient                                                         

have an inpatient                                                             

admission?                                                                    

Comorbidities - select        Answers:  Cerebrovascular disease               

all that apply                          (CVA, TIA, aneurysms, vasc            

                                        ular dementia)                        

                                        Diabetes (uncontrolled or             

                                        controlled)                           

# of Emergency department     Answers:  1-2                                   

visits in the last 6                                                          

months                                                                        

Score: 8

 

Date Signed:  03/20/2019 03:27 PM

Electronically Signed By:MANJIT Engel

 

 

----------------------------------------------

[facility] CM Progress Note

----------------------------------------------

CM Note

 

CM Note                       

Notes:

CM met with pt, her  and daughter. Family is advocating for pt to go to inpt rehab. CM 

initiated referral to [facility] for approval for rehab. CM provided education on SNF if [facility] does 

not approve inpt rehab. CM to follow. 



Plan: Inpatient rehab pending [facility] approval. 

 

Date Signed:  03/19/2019 11:49 AM

Electronically Signed By:NYDIA Blackwood

 

 

----------------------------------------------

[facility] CM Progress Note

----------------------------------------------

CM Note

 

CM Note                       

Notes:

Pt medically stable for d/c home with husb/dghtr supervision and intensive outpatient at [facility]. 

 

[facility]P rec home care, OT rec SNF and PT rec home. [facility] declined pt for SNF and inpatient 

rehab, approving pt for intensive outpatient at [facility]. Pt heydi Galaviz talked to Jennifer 

[facility] prior to d/c and feels comfortable with the [facility] recommendation. Pt will get three hours 

of therapy a day, three times a week at [facility]. Anastacia and pt spouse feel comfortable getting pt to 


[place] three times a week and will follow up with [facility] if they change their mind. Anastacia will stay 

with pt and  at d/c for supervision. 

 

Date Signed:  03/20/2019 03:41 PM

Electronically Signed By:MANJIT Engel

 

 

----------------------------------------------

Intervention Information

----------------------------------------------

Intervention Type:*Incorrect Registration            Date of Service:03/18/2019 01:59 PM

Patient Type:Observation                             Staff Member:KEISHA Retana, Rupa

Hours:                                               Discipline:

Severity:                                            Comment:

## 2019-03-20 NOTE — ASMTLACE
LACE

 

Length of stay for            Answers:  2 days                                

current admission                                                             

Acuity / Level of             Answers:  Yes                                   

Care: Did the patient                                                         

have an inpatient                                                             

admission?                                                                    

Comorbidities - select        Answers:  Cerebrovascular disease               

all that apply                          (CVA, TIA, aneurysms, vasc            

                                        ular dementia)                        

                                        Diabetes (uncontrolled or             

                                        controlled)                           

# of Emergency department     Answers:  1-2                                   

visits in the last 6                                                          

months                                                                        

Score: 8

 

Date Signed:  03/20/2019 03:27 PM

Electronically Signed By:MANJIT Engel